# Patient Record
(demographics unavailable — no encounter records)

---

## 2025-04-07 NOTE — HISTORY OF PRESENT ILLNESS
[FreeTextEntry1] : ARLETTE [de-identified] : 89 y/o male with pmhx of HTN, DM, hypothyroidism presents for AWV. Patient presents with his son Frank who he prefers to translate. Patient is in need of refills of his medications. He has been having weakness in b/l knee. Denies pain or numbness/tingling. No back pain.

## 2025-04-07 NOTE — HEALTH RISK ASSESSMENT
[Good] : ~his/her~ current health as good [No] : In the past 12 months have you used drugs other than those required for medical reasons? No [No falls in past year] : Patient reported no falls in the past year [0] : 2) Feeling down, depressed, or hopeless: Not at all (0) [PHQ-2 Negative - No further assessment needed] : PHQ-2 Negative - No further assessment needed [Yes] : Reviewed medication list for presence of high-risk medications. [Opioids] : opioids [Never] : Never [Patient declined colonoscopy] : Patient declined colonoscopy [None] : None [With Family] : lives with family [Retired] : retired [] :  [Fully functional (bathing, dressing, toileting, transferring, walking, feeding)] : Fully functional (bathing, dressing, toileting, transferring, walking, feeding) [Independent] : managing finances [Some assistance needed] : doing laundry [Full assistance needed] : using transportation [Patient/Caregiver not ready to engage] : , patient/caregiver not ready to engage [[id number]] : 0 [Change in mental status noted] : No change in mental status noted [Language] : denies difficulty with language [Behavior] : denies difficulty with behavior [Learning/Retaining New Information] : denies difficulty learning/retaining new information [Reports changes in hearing] : Reports no changes in hearing [Reports changes in vision] : Reports no changes in vision

## 2025-04-07 NOTE — ASSESSMENT
[Vaccines Reviewed] : Immunizations reviewed today. Please see immunization details in the vaccine log within the immunization flowsheet.  [FreeTextEntry1] : CPE: -will order routine lab work -medical history including surgical history, family history, and current medications reviewed and documented as above -Age appropriate screenings including but not limited to cancer screening, alcohol misuse (audit-c) and depression screening as documented above -patient counseled on healthy diet and lifestyle changes including increasing physical activity and eating a diet low in processed foods and high in fruits and vegetables -counseled patient on age appropriate vaccinations and immunization record updated  Type 2 DM continue lantus 30 units at bed time and novolog 10 units tid prior to meals continue jardiance 10mg daily and glipizide 10mg daily will renew cgm device will check [id number] today  Hypothyroidism continue levothyroxine 125mcg daily will repeat tfts today  HTN bp stable continue losartan 25mg jed in setting of DM  HLD continue atorvastatin 20mg daily in setting of DM  Knee weakness b/l no true weakness on exam